# Patient Record
Sex: MALE | Race: WHITE | NOT HISPANIC OR LATINO | URBAN - METROPOLITAN AREA
[De-identification: names, ages, dates, MRNs, and addresses within clinical notes are randomized per-mention and may not be internally consistent; named-entity substitution may affect disease eponyms.]

---

## 2017-12-21 ENCOUNTER — APPOINTMENT (OUTPATIENT)
Age: 52
Setting detail: DERMATOLOGY
End: 2018-01-01

## 2017-12-21 DIAGNOSIS — L738 OTHER SPECIFIED DISEASES OF HAIR AND HAIR FOLLICLES: ICD-10-CM

## 2017-12-21 DIAGNOSIS — L81.4 OTHER MELANIN HYPERPIGMENTATION: ICD-10-CM

## 2017-12-21 DIAGNOSIS — L57.8 OTHER SKIN CHANGES DUE TO CHRONIC EXPOSURE TO NONIONIZING RADIATION: ICD-10-CM

## 2017-12-21 DIAGNOSIS — L71.8 OTHER ROSACEA: ICD-10-CM

## 2017-12-21 DIAGNOSIS — D22 MELANOCYTIC NEVI: ICD-10-CM

## 2017-12-21 DIAGNOSIS — L663 OTHER SPECIFIED DISEASES OF HAIR AND HAIR FOLLICLES: ICD-10-CM

## 2017-12-21 DIAGNOSIS — L57.0 ACTINIC KERATOSIS: ICD-10-CM

## 2017-12-21 DIAGNOSIS — I83.9 ASYMPTOMATIC VARICOSE VEINS OF LOWER EXTREMITIES: ICD-10-CM

## 2017-12-21 DIAGNOSIS — I78.8 OTHER DISEASES OF CAPILLARIES: ICD-10-CM

## 2017-12-21 DIAGNOSIS — Z71.89 OTHER SPECIFIED COUNSELING: ICD-10-CM

## 2017-12-21 DIAGNOSIS — L82.1 OTHER SEBORRHEIC KERATOSIS: ICD-10-CM

## 2017-12-21 PROBLEM — I83.92 ASYMPTOMATIC VARICOSE VEINS OF LEFT LOWER EXTREMITY: Status: ACTIVE | Noted: 2017-12-21

## 2017-12-21 PROBLEM — L02.222 FURUNCLE OF BACK [ANY PART, EXCEPT BUTTOCK]: Status: ACTIVE | Noted: 2017-12-21

## 2017-12-21 PROBLEM — L02.223 FURUNCLE OF CHEST WALL: Status: ACTIVE | Noted: 2017-12-21

## 2017-12-21 PROBLEM — D22.5 MELANOCYTIC NEVI OF TRUNK: Status: ACTIVE | Noted: 2017-12-21

## 2017-12-21 PROCEDURE — OTHER OTHER: OTHER

## 2017-12-21 PROCEDURE — 17003 DESTRUCT PREMALG LES 2-14: CPT

## 2017-12-21 PROCEDURE — OTHER LIQUID NITROGEN: OTHER

## 2017-12-21 PROCEDURE — OTHER PRESCRIPTION: OTHER

## 2017-12-21 PROCEDURE — OTHER MIPS QUALITY: OTHER

## 2017-12-21 PROCEDURE — OTHER REASSURANCE: OTHER

## 2017-12-21 PROCEDURE — 17000 DESTRUCT PREMALG LESION: CPT

## 2017-12-21 PROCEDURE — OTHER COUNSELING: OTHER

## 2017-12-21 PROCEDURE — 99203 OFFICE O/P NEW LOW 30 MIN: CPT | Mod: 25

## 2017-12-21 RX ORDER — CLINDAMYCIN PHOSPHATE 10 MG/ML
SOLUTION TOPICAL QD
Qty: 1 | Refills: 1 | Status: ERX | COMMUNITY
Start: 2017-12-21

## 2017-12-21 ASSESSMENT — LOCATION DETAILED DESCRIPTION DERM
LOCATION DETAILED: RIGHT CHIN
LOCATION DETAILED: RIGHT SUPERIOR MEDIAL UPPER BACK
LOCATION DETAILED: LEFT SUPERIOR FRONTAL SCALP
LOCATION DETAILED: RIGHT NASAL ALA
LOCATION DETAILED: LEFT MEDIAL FRONTAL SCALP
LOCATION DETAILED: LEFT PROXIMAL PRETIBIAL REGION
LOCATION DETAILED: LEFT SUPERIOR MEDIAL UPPER BACK
LOCATION DETAILED: NASAL INFRATIP
LOCATION DETAILED: LEFT NASAL ALA
LOCATION DETAILED: RIGHT NASAL DORSUM
LOCATION DETAILED: RIGHT INFERIOR CENTRAL MALAR CHEEK
LOCATION DETAILED: RIGHT SUPERIOR LATERAL UPPER BACK
LOCATION DETAILED: RIGHT POSTERIOR SHOULDER
LOCATION DETAILED: RIGHT CENTRAL MALAR CHEEK
LOCATION DETAILED: RIGHT MEDIAL FRONTAL SCALP
LOCATION DETAILED: LEFT CENTRAL MALAR CHEEK
LOCATION DETAILED: LEFT CENTRAL FRONTAL SCALP
LOCATION DETAILED: MID POSTERIOR NECK
LOCATION DETAILED: LEFT DISTAL DORSAL FOREARM
LOCATION DETAILED: STERNUM
LOCATION DETAILED: LEFT CHIN
LOCATION DETAILED: LEFT CENTRAL PARIETAL SCALP
LOCATION DETAILED: LEFT INFERIOR CENTRAL MALAR CHEEK
LOCATION DETAILED: RIGHT DISTAL DORSAL FOREARM
LOCATION DETAILED: RIGHT INFERIOR FOREHEAD
LOCATION DETAILED: LEFT MEDIAL TRAPEZIAL NECK
LOCATION DETAILED: RIGHT SUPERIOR FOREHEAD

## 2017-12-21 ASSESSMENT — LOCATION SIMPLE DESCRIPTION DERM
LOCATION SIMPLE: LEFT UPPER BACK
LOCATION SIMPLE: RIGHT UPPER BACK
LOCATION SIMPLE: LEFT SCALP
LOCATION SIMPLE: NOSE
LOCATION SIMPLE: POSTERIOR NECK
LOCATION SIMPLE: LEFT PRETIBIAL REGION
LOCATION SIMPLE: CHIN
LOCATION SIMPLE: RIGHT CHEEK
LOCATION SIMPLE: LEFT NOSE
LOCATION SIMPLE: CHEST
LOCATION SIMPLE: LEFT CHEEK
LOCATION SIMPLE: RIGHT FOREARM
LOCATION SIMPLE: RIGHT SCALP
LOCATION SIMPLE: RIGHT SHOULDER
LOCATION SIMPLE: RIGHT NOSE
LOCATION SIMPLE: RIGHT FOREHEAD
LOCATION SIMPLE: SCALP
LOCATION SIMPLE: LEFT FOREARM

## 2017-12-21 ASSESSMENT — LOCATION ZONE DERM
LOCATION ZONE: ARM
LOCATION ZONE: NECK
LOCATION ZONE: TRUNK
LOCATION ZONE: SCALP
LOCATION ZONE: NOSE
LOCATION ZONE: FACE
LOCATION ZONE: LEG

## 2017-12-21 NOTE — PROCEDURE: OTHER
Other (Free Text): Refer to 
Note Text (......Xxx Chief Complaint.): This diagnosis correlates with the
Detail Level: Zone
Other (Free Text): Patient declines treatment
Other (Free Text): Refer to Dr Reina for consult
Detail Level: Detailed

## 2017-12-21 NOTE — PROCEDURE: LIQUID NITROGEN
Detail Level: Simple
Number Of Freeze-Thaw Cycles: 2 freeze-thaw cycles
Post-Care Instructions: I reviewed with the patient in detail post-care instructions. Patient is to wear sunprotection, and avoid picking at any of the treated lesions. Pt may apply Vaseline to crusted or scabbing areas.
Duration Of Freeze Thaw-Cycle (Seconds): 1
Consent: The patient's consent was obtained including but not limited to risks of crusting, scabbing, blistering, scarring, darker or lighter pigmentary change, recurrence, incomplete removal and infection.
Render Post-Care Instructions In Note?: yes

## 2017-12-22 ENCOUNTER — APPOINTMENT (OUTPATIENT)
Age: 52
Setting detail: DERMATOLOGY
End: 2017-12-29

## 2017-12-22 DIAGNOSIS — Z41.9 ENCOUNTER FOR PROCEDURE FOR PURPOSES OTHER THAN REMEDYING HEALTH STATE, UNSPECIFIED: ICD-10-CM

## 2017-12-22 PROCEDURE — OTHER OTHER (COSMETIC): OTHER

## 2017-12-22 PROCEDURE — OTHER MEDICAL CONSULTATION: PULSED-DYE LASER: OTHER

## 2017-12-22 ASSESSMENT — LOCATION SIMPLE DESCRIPTION DERM
LOCATION SIMPLE: LEFT CHEEK
LOCATION SIMPLE: LEFT FOREHEAD
LOCATION SIMPLE: RIGHT CHEEK

## 2017-12-22 ASSESSMENT — LOCATION DETAILED DESCRIPTION DERM
LOCATION DETAILED: LEFT SUPERIOR MEDIAL FOREHEAD
LOCATION DETAILED: RIGHT CENTRAL MALAR CHEEK
LOCATION DETAILED: LEFT INFERIOR LATERAL MALAR CHEEK

## 2017-12-22 ASSESSMENT — LOCATION ZONE DERM: LOCATION ZONE: FACE

## 2018-01-08 ENCOUNTER — APPOINTMENT (OUTPATIENT)
Age: 53
Setting detail: DERMATOLOGY
End: 2018-01-08

## 2018-01-08 DIAGNOSIS — Z41.9 ENCOUNTER FOR PROCEDURE FOR PURPOSES OTHER THAN REMEDYING HEALTH STATE, UNSPECIFIED: ICD-10-CM

## 2018-01-08 PROCEDURE — OTHER OTHER (COSMETIC): OTHER

## 2018-01-08 PROCEDURE — OTHER PULSED-DYE LASER: OTHER

## 2018-01-08 ASSESSMENT — LOCATION DETAILED DESCRIPTION DERM
LOCATION DETAILED: INFERIOR MID FOREHEAD
LOCATION DETAILED: RIGHT NASAL SIDEWALL
LOCATION DETAILED: RIGHT INFERIOR CENTRAL MALAR CHEEK
LOCATION DETAILED: RIGHT MEDIAL MALAR CHEEK
LOCATION DETAILED: NASAL SUPRATIP
LOCATION DETAILED: LEFT NASAL ALA
LOCATION DETAILED: RIGHT MID PREAURICULAR CHEEK
LOCATION DETAILED: LEFT CENTRAL MALAR CHEEK
LOCATION DETAILED: LEFT SUPERIOR CENTRAL MALAR CHEEK
LOCATION DETAILED: NASAL DORSUM

## 2018-01-08 ASSESSMENT — LOCATION SIMPLE DESCRIPTION DERM
LOCATION SIMPLE: LEFT NOSE
LOCATION SIMPLE: RIGHT NOSE
LOCATION SIMPLE: LEFT CHEEK
LOCATION SIMPLE: RIGHT CHEEK
LOCATION SIMPLE: NOSE
LOCATION SIMPLE: INFERIOR FOREHEAD

## 2018-01-08 ASSESSMENT — LOCATION ZONE DERM
LOCATION ZONE: FACE
LOCATION ZONE: NOSE

## 2018-01-08 NOTE — PROCEDURE: OTHER (COSMETIC)
Detail Level: Zone
Other (Free Text): Treated face with PDL . well tolerated. pt was given ice to help settle swelling. applied calming cream and spf 50 post tx. LM
Price (Use Numbers Only, No Special Characters Or $): 300.00

## 2018-01-08 NOTE — PROCEDURE: PULSED-DYE LASER
Delay Time (Ms): 20
Cryogen Time (Ms): 30
Spot Size: 5 mm
Fluence In J/Cm2 (Optional): 10.50
Consent: Written consent obtained, risks reviewed including but not limited to crusting, scabbing, blistering, scarring, darker or lighter pigmentary change, incidental hair removal, bruising, and/or incomplete removal.
Spot Size: 7 mm
Pulse Duration: 1.5 ms
Fluence In J/Cm2 (Optional): 11
Location (Required For Details To Render In Note But Body Touch Will Also Count For First Location): mid face
Pulse Duration: 10 ms
Post-Care Instructions: I reviewed with the patient in detail post-care instructions. Patient should stay away from the sun and wear sun protection until treated areas are fully healed.
Detail Level: Zone
Price (Use Numbers Only, No Special Characters Or $): N/c
Fluence In J/Cm2 (Optional): 10
Post-Procedure Care: Vaseline and ice applied. Post care reviewed with patient.
Cryogen Time (Ms): 40
Laser Type: Vbeam 595nm
Treated Area: small area

## 2018-01-12 ENCOUNTER — APPOINTMENT (OUTPATIENT)
Age: 53
Setting detail: DERMATOLOGY
End: 2018-01-25

## 2018-01-12 DIAGNOSIS — I83.89 VARICOSE VEINS OF LOWER EXTREMITIES WITH OTHER COMPLICATIONS: ICD-10-CM

## 2018-01-12 PROBLEM — I83.893 VARICOSE VEINS OF BILATERAL LOWER EXTREMITIES WITH OTHER COMPLICATIONS: Status: ACTIVE | Noted: 2018-01-12

## 2018-01-12 PROCEDURE — OTHER SEPARATE AND IDENTIFIABLE DOCUMENTATION: OTHER

## 2018-01-12 PROCEDURE — 93970 EXTREMITY STUDY: CPT

## 2018-01-12 PROCEDURE — OTHER COUNSELING: OTHER

## 2018-01-12 PROCEDURE — OTHER CEAP-C CLASSIFICATION: OTHER

## 2018-01-12 PROCEDURE — OTHER DOPPLER US: OTHER

## 2018-01-12 PROCEDURE — OTHER EDUCATIONAL RESOURCES PROVIDED: OTHER

## 2018-01-12 PROCEDURE — 99213 OFFICE O/P EST LOW 20 MIN: CPT | Mod: 25

## 2018-01-12 ASSESSMENT — LOCATION DETAILED DESCRIPTION DERM
LOCATION DETAILED: LEFT ANTERIOR PROXIMAL THIGH
LOCATION DETAILED: RIGHT ANTERIOR PROXIMAL THIGH
LOCATION DETAILED: RIGHT PROXIMAL PRETIBIAL REGION
LOCATION DETAILED: LEFT DISTAL PRETIBIAL REGION

## 2018-01-12 ASSESSMENT — LOCATION SIMPLE DESCRIPTION DERM
LOCATION SIMPLE: RIGHT THIGH
LOCATION SIMPLE: LEFT THIGH
LOCATION SIMPLE: RIGHT PRETIBIAL REGION
LOCATION SIMPLE: LEFT PRETIBIAL REGION

## 2018-01-12 ASSESSMENT — LOCATION ZONE DERM: LOCATION ZONE: LEG

## 2018-01-12 NOTE — HPI: VEIN EVALUATION
How Severe Is/Are Your Symptoms?: mild
Is This A New Presentation, Or A Follow-Up?: Vein Evaluation
Additional History: Family history of varicose veins (mother, sister).
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

## 2018-01-12 NOTE — PROCEDURE: CEAP-C CLASSIFICATION
Right Dorsalis Pedis Pulse: 2 (Easily palpable)
Left Leg Circumference: medium
Detail Level: Zone
Right Leg: Peripheral Vascular Disease?: No
Follow Up Instructions:: Bilateral duplex ultrasound venous reflux study was reviewed with the results and finalized a treatment plan. Preventive strategies include weight loss through diet and exercise and toning leg muscles. A venous fact sheet was given, which reviews venous anatomy/pathophysiology and treatment options. The pathophysiology of venous disease and potential treatment options were discussed in detail, especially the non-FDA status of foam sclerotherapy with its risks benefits and alternatives. The patient's questions were answered in full

## 2018-02-07 ENCOUNTER — APPOINTMENT (OUTPATIENT)
Age: 53
Setting detail: DERMATOLOGY
End: 2018-02-08

## 2018-02-07 DIAGNOSIS — L738 OTHER SPECIFIED DISEASES OF HAIR AND HAIR FOLLICLES: ICD-10-CM

## 2018-02-07 DIAGNOSIS — B07.8 OTHER VIRAL WARTS: ICD-10-CM

## 2018-02-07 DIAGNOSIS — L663 OTHER SPECIFIED DISEASES OF HAIR AND HAIR FOLLICLES: ICD-10-CM

## 2018-02-07 DIAGNOSIS — I78.8 OTHER DISEASES OF CAPILLARIES: ICD-10-CM

## 2018-02-07 DIAGNOSIS — L57.0 ACTINIC KERATOSIS: ICD-10-CM

## 2018-02-07 DIAGNOSIS — R23.1 PALLOR: ICD-10-CM

## 2018-02-07 PROBLEM — L02.222 FURUNCLE OF BACK [ANY PART, EXCEPT BUTTOCK]: Status: ACTIVE | Noted: 2018-02-07

## 2018-02-07 PROBLEM — L02.02 FURUNCLE OF FACE: Status: ACTIVE | Noted: 2018-02-07

## 2018-02-07 PROCEDURE — OTHER COUNSELING: OTHER

## 2018-02-07 PROCEDURE — OTHER LIQUID NITROGEN: OTHER

## 2018-02-07 PROCEDURE — 17003 DESTRUCT PREMALG LES 2-14: CPT

## 2018-02-07 PROCEDURE — 17110 DESTRUCT B9 LESION 1-14: CPT

## 2018-02-07 PROCEDURE — OTHER BENIGN DESTRUCTION: OTHER

## 2018-02-07 PROCEDURE — OTHER TREATMENT REGIMEN: OTHER

## 2018-02-07 PROCEDURE — 17000 DESTRUCT PREMALG LESION: CPT | Mod: 59

## 2018-02-07 PROCEDURE — 99214 OFFICE O/P EST MOD 30 MIN: CPT | Mod: 25

## 2018-02-07 ASSESSMENT — LOCATION ZONE DERM
LOCATION ZONE: NOSE
LOCATION ZONE: SCALP
LOCATION ZONE: FINGER
LOCATION ZONE: FACE
LOCATION ZONE: TRUNK
LOCATION ZONE: ARM
LOCATION ZONE: HAND

## 2018-02-07 ASSESSMENT — LOCATION DETAILED DESCRIPTION DERM
LOCATION DETAILED: LEFT VENTRAL PROXIMAL FOREARM
LOCATION DETAILED: LEFT RADIAL DORSAL HAND
LOCATION DETAILED: 3RD WEB SPACE RIGHT HAND
LOCATION DETAILED: NASAL TIP
LOCATION DETAILED: RIGHT MEDIAL MALAR CHEEK
LOCATION DETAILED: LEFT DORSAL WRIST
LOCATION DETAILED: LEFT MEDIAL SUPERIOR CHEST
LOCATION DETAILED: RIGHT DORSAL SMALL METACARPOPHALANGEAL JOINT
LOCATION DETAILED: RIGHT SUPERIOR MEDIAL UPPER BACK
LOCATION DETAILED: RIGHT MIDDLE PROXIMAL INTERPHALANGEAL JOINT
LOCATION DETAILED: LEFT MEDIAL MALAR CHEEK
LOCATION DETAILED: RIGHT VENTRAL PROXIMAL FOREARM
LOCATION DETAILED: RIGHT SUPERIOR PARIETAL SCALP
LOCATION DETAILED: LEFT SUPERIOR MEDIAL FOREHEAD
LOCATION DETAILED: RIGHT CENTRAL MALAR CHEEK
LOCATION DETAILED: NASAL SUPRATIP
LOCATION DETAILED: MID-FRONTAL SCALP
LOCATION DETAILED: LEFT NASAL DORSUM
LOCATION DETAILED: NASAL DORSUM

## 2018-02-07 ASSESSMENT — LOCATION SIMPLE DESCRIPTION DERM
LOCATION SIMPLE: RIGHT MIDDLE FINGER
LOCATION SIMPLE: SCALP
LOCATION SIMPLE: RIGHT FOREARM
LOCATION SIMPLE: RIGHT HAND
LOCATION SIMPLE: ANTERIOR SCALP
LOCATION SIMPLE: LEFT WRIST
LOCATION SIMPLE: LEFT CHEEK
LOCATION SIMPLE: LEFT HAND
LOCATION SIMPLE: RIGHT CHEEK
LOCATION SIMPLE: LEFT FOREHEAD
LOCATION SIMPLE: CHEST
LOCATION SIMPLE: LEFT FOREARM
LOCATION SIMPLE: RIGHT UPPER BACK
LOCATION SIMPLE: NOSE

## 2018-02-07 NOTE — PROCEDURE: LIQUID NITROGEN
Duration Of Freeze Thaw-Cycle (Seconds): 1
Number Of Freeze-Thaw Cycles: 2 freeze-thaw cycles
Render Post-Care Instructions In Note?: yes
Post-Care Instructions: I reviewed with the patient in detail post-care instructions. Patient is to wear sunprotection, and avoid picking at any of the treated lesions. Pt may apply Vaseline to crusted or scabbing areas.
Consent: The patient's consent was obtained including but not limited to risks of crusting, scabbing, blistering, scarring, darker or lighter pigmentary change, recurrence, incomplete removal and infection.
Detail Level: Simple

## 2018-02-07 NOTE — PROCEDURE: BENIGN DESTRUCTION
Post-Care Instructions: I reviewed with the patient in detail post-care instructions. Patient is to wear sunprotection, and avoid picking at any of the treated lesions. Pt may apply Vaseline to crusted or scabbing areas.
Include Z78.9 (Other Specified Conditions Influencing Health Status) As An Associated Diagnosis?: Yes
Render Post-Care Instructions In Note?: no
Medical Necessity Information: It is in your best interest to select a reason for this procedure from the list below. All of these items fulfill various CMS LCD requirements except the new and changing color options.
Treatment Number (Will Not Render If 0): 0
Consent: The patient's consent was obtained including but not limited to risks of crusting, scabbing, blistering, scarring, darker or lighter pigmentary change, recurrence, incomplete removal and infection.
Detail Level: Simple
Medical Necessity Clause: This procedure was medically necessary because the lesions that were treated were: recurrent

## 2018-02-14 ENCOUNTER — APPOINTMENT (OUTPATIENT)
Age: 53
Setting detail: DERMATOLOGY
End: 2018-02-15

## 2018-02-14 DIAGNOSIS — L82.1 OTHER SEBORRHEIC KERATOSIS: ICD-10-CM

## 2018-02-14 DIAGNOSIS — L72.0 EPIDERMAL CYST: ICD-10-CM

## 2018-02-14 DIAGNOSIS — L73.8 OTHER SPECIFIED FOLLICULAR DISORDERS: ICD-10-CM

## 2018-02-14 DIAGNOSIS — D22 MELANOCYTIC NEVI: ICD-10-CM

## 2018-02-14 PROBLEM — D22.5 MELANOCYTIC NEVI OF TRUNK: Status: ACTIVE | Noted: 2018-02-14

## 2018-02-14 PROCEDURE — OTHER OTHER: OTHER

## 2018-02-14 PROCEDURE — OTHER COUNSELING: OTHER

## 2018-02-14 PROCEDURE — OTHER TREATMENT REGIMEN: OTHER

## 2018-02-14 PROCEDURE — 99213 OFFICE O/P EST LOW 20 MIN: CPT | Mod: 24

## 2018-02-14 ASSESSMENT — LOCATION ZONE DERM
LOCATION ZONE: FACE
LOCATION ZONE: TRUNK
LOCATION ZONE: EYELID

## 2018-02-14 ASSESSMENT — LOCATION SIMPLE DESCRIPTION DERM
LOCATION SIMPLE: LEFT FOREHEAD
LOCATION SIMPLE: RIGHT FOREHEAD
LOCATION SIMPLE: CHEST
LOCATION SIMPLE: RIGHT SUPERIOR EYELID

## 2018-02-14 ASSESSMENT — LOCATION DETAILED DESCRIPTION DERM
LOCATION DETAILED: RIGHT FOREHEAD
LOCATION DETAILED: RIGHT MEDIAL SUPERIOR EYELID
LOCATION DETAILED: LEFT MEDIAL FOREHEAD
LOCATION DETAILED: LEFT INFERIOR FOREHEAD
LOCATION DETAILED: RIGHT MEDIAL INFERIOR CHEST

## 2018-02-14 NOTE — PROCEDURE: OTHER
Detail Level: Detailed
Note Text (......Xxx Chief Complaint.): This diagnosis correlates with the
Other (Free Text): Quoted $150/session

## 2018-03-31 ENCOUNTER — RX ONLY (RX ONLY)
Age: 53
End: 2018-03-31

## 2018-03-31 RX ORDER — CLINDAMYCIN PHOSPHATE 10 MG/ML
SOLUTION TOPICAL
Qty: 1 | Refills: 2 | Status: ERX

## 2018-04-13 ENCOUNTER — APPOINTMENT (OUTPATIENT)
Age: 53
Setting detail: DERMATOLOGY
End: 2018-04-18

## 2018-04-13 DIAGNOSIS — I83.89 VARICOSE VEINS OF LOWER EXTREMITIES WITH OTHER COMPLICATIONS: ICD-10-CM

## 2018-04-13 PROBLEM — I83.893 VARICOSE VEINS OF BILATERAL LOWER EXTREMITIES WITH OTHER COMPLICATIONS: Status: ACTIVE | Noted: 2018-04-13

## 2018-04-13 PROCEDURE — OTHER CEAP-C CLASSIFICATION: OTHER

## 2018-04-13 PROCEDURE — OTHER COUNSELING: OTHER

## 2018-04-13 PROCEDURE — 99213 OFFICE O/P EST LOW 20 MIN: CPT

## 2018-04-13 PROCEDURE — OTHER SEPARATE AND IDENTIFIABLE DOCUMENTATION: OTHER

## 2018-04-13 PROCEDURE — OTHER EDUCATIONAL RESOURCES PROVIDED: OTHER

## 2018-04-13 ASSESSMENT — LOCATION DETAILED DESCRIPTION DERM
LOCATION DETAILED: LEFT ANTERIOR DISTAL THIGH
LOCATION DETAILED: RIGHT ANTERIOR PROXIMAL THIGH

## 2018-04-13 ASSESSMENT — LOCATION ZONE DERM: LOCATION ZONE: LEG

## 2018-04-13 ASSESSMENT — LOCATION SIMPLE DESCRIPTION DERM
LOCATION SIMPLE: RIGHT THIGH
LOCATION SIMPLE: LEFT THIGH

## 2018-06-13 ENCOUNTER — APPOINTMENT (OUTPATIENT)
Age: 53
Setting detail: DERMATOLOGY
End: 2018-06-27

## 2018-06-13 DIAGNOSIS — I83.89 VARICOSE VEINS OF LOWER EXTREMITIES WITH OTHER COMPLICATIONS: ICD-10-CM

## 2018-06-13 PROBLEM — I83.891 VARICOSE VEINS OF RIGHT LOWER EXTREMITY WITH OTHER COMPLICATIONS: Status: ACTIVE | Noted: 2018-06-13

## 2018-06-13 PROCEDURE — OTHER ENDOVENOUS ABLATION: OTHER

## 2018-06-13 PROCEDURE — 36475 ENDOVENOUS RF 1ST VEIN: CPT

## 2018-06-13 ASSESSMENT — LOCATION SIMPLE DESCRIPTION DERM: LOCATION SIMPLE: RIGHT PRETIBIAL REGION

## 2018-06-13 ASSESSMENT — LOCATION ZONE DERM: LOCATION ZONE: LEG

## 2018-06-13 ASSESSMENT — LOCATION DETAILED DESCRIPTION DERM: LOCATION DETAILED: RIGHT PROXIMAL PRETIBIAL REGION

## 2018-06-13 NOTE — PROCEDURE: ENDOVENOUS ABLATION
Consent was obtained with risks, benefits, and alternatives discussed for the above procedures. Preoperative medications were taken as above.
Estimated Blood Loss (Cc): minimal
Rf Cycles: 8
Disposition: Compression dressings were placed. Wound care instructions were given, verbal and written.\\n\\nPost-treatment Instructions:\\n• You may experience pain and inflammation, if you are not allergic or sensitive, please take Advil, Motrin or Ibuprofen 600mg 3 times daily with food.\\n• Use the prescription pain medicine if needed.\\n• You may feel a “tight” or pulling sensation or firm “cord” in your leg for several weeks. Massaging your legs will help. This feeling will go away with time.\\n• Walk but avoid prolonged standing or sitting. Elevate your legs as much as possible for the first three (3) days after your treatment.\\n• You may resume normal activity after three (3) days and engage in heavy exercise ten (10) days after your treatment.\\n• Remove wrap after 48 hours, leaving the steri-strip tape in place. At that time, you may shower. Do not be concerned if the steri-strip comes off.\\n• Wear compression stockings for two (2) weeks, removing them at bedtime. \\n• If your have taken a sedative (valium) during your treatment, we advise you to arrange for transportation home after your treatment.\\n• If you have any questions or concerns after your treatment, contact our office immediately. In the event of a life-threatening emergency, contact 911.
Body Location Override (Optional - Billing Will Still Be Based On Selected Body Map Location): right lower leg
Detail Level: Detailed
Number Of Incisions Per Microphlebectomy: 0
Hemostasis: Pressure
Tumescent Anesthesia Volume In Cc: 300
Rf Time (Include Units): 2:00

## 2018-06-15 ENCOUNTER — APPOINTMENT (OUTPATIENT)
Age: 53
Setting detail: DERMATOLOGY
End: 2018-07-13

## 2018-06-15 DIAGNOSIS — I83.89 VARICOSE VEINS OF LOWER EXTREMITIES WITH OTHER COMPLICATIONS: ICD-10-CM

## 2018-06-15 PROBLEM — I83.893 VARICOSE VEINS OF BILATERAL LOWER EXTREMITIES WITH OTHER COMPLICATIONS: Status: ACTIVE | Noted: 2018-06-15

## 2018-06-15 PROCEDURE — OTHER ENDOVENOUS ABLATION: OTHER

## 2018-06-15 PROCEDURE — 36475 ENDOVENOUS RF 1ST VEIN: CPT

## 2018-06-15 PROCEDURE — OTHER DOPPLER US: OTHER

## 2018-06-15 PROCEDURE — 93971 EXTREMITY STUDY: CPT | Mod: 59

## 2018-06-15 ASSESSMENT — LOCATION SIMPLE DESCRIPTION DERM
LOCATION SIMPLE: LEFT PRETIBIAL REGION
LOCATION SIMPLE: RIGHT THIGH

## 2018-06-15 ASSESSMENT — LOCATION ZONE DERM: LOCATION ZONE: LEG

## 2018-06-15 ASSESSMENT — LOCATION DETAILED DESCRIPTION DERM
LOCATION DETAILED: RIGHT ANTERIOR DISTAL THIGH
LOCATION DETAILED: LEFT PROXIMAL PRETIBIAL REGION

## 2018-06-15 NOTE — PROCEDURE: ENDOVENOUS ABLATION
Estimated Blood Loss (Cc): minimal
Rf Time (Include Units): 3:20
Hemostasis: Pressure
Tumescent Anesthesia Volume In Cc: 300
Rf Cycles: 10
Body Location Override (Optional - Billing Will Still Be Based On Selected Body Map Location): left leg
Number Of Incisions Per Microphlebectomy: 0
Disposition: Compression dressings were placed. Wound care instructions were given, verbal and written.\\n\\nPost-treatment Instructions:\\n• You may experience pain and inflammation, if you are not allergic or sensitive, please take Advil, Motrin or Ibuprofen 600mg 3 times daily with food.\\n• Use the prescription pain medicine if needed.\\n• You may feel a “tight” or pulling sensation or firm “cord” in your leg for several weeks. Massaging your legs will help. This feeling will go away with time.\\n• Walk but avoid prolonged standing or sitting. Elevate your legs as much as possible for the first three (3) days after your treatment.\\n• You may resume normal activity after three (3) days and engage in heavy exercise ten (10) days after your treatment.\\n• Remove wrap after 48 hours, leaving the steri-strip tape in place. At that time, you may shower. Do not be concerned if the steri-strip comes off.\\n• Wear compression stockings for two (2) weeks, removing them at bedtime. \\n• If your have taken a sedative (valium) during your treatment, we advise you to arrange for transportation home after your treatment.\\n• If you have any questions or concerns after your treatment, contact our office immediately. In the event of a life-threatening emergency, contact 911.
Detail Level: Detailed
Consent was obtained with risks, benefits, and alternatives discussed for the above procedures. Preoperative medications were taken as above.

## 2018-06-27 ENCOUNTER — APPOINTMENT (OUTPATIENT)
Age: 53
Setting detail: DERMATOLOGY
End: 2018-07-13

## 2018-06-27 DIAGNOSIS — I83.89 VARICOSE VEINS OF LOWER EXTREMITIES WITH OTHER COMPLICATIONS: ICD-10-CM

## 2018-06-27 PROBLEM — I83.893 VARICOSE VEINS OF BILATERAL LOWER EXTREMITIES WITH OTHER COMPLICATIONS: Status: ACTIVE | Noted: 2018-06-27

## 2018-06-27 PROCEDURE — OTHER DOPPLER US: OTHER

## 2018-06-27 PROCEDURE — 99214 OFFICE O/P EST MOD 30 MIN: CPT | Mod: 25

## 2018-06-27 PROCEDURE — 93970 EXTREMITY STUDY: CPT

## 2018-06-27 ASSESSMENT — LOCATION SIMPLE DESCRIPTION DERM
LOCATION SIMPLE: LEFT THIGH
LOCATION SIMPLE: RIGHT THIGH

## 2018-06-27 ASSESSMENT — LOCATION DETAILED DESCRIPTION DERM
LOCATION DETAILED: RIGHT ANTERIOR DISTAL THIGH
LOCATION DETAILED: LEFT ANTERIOR PROXIMAL THIGH

## 2018-06-27 ASSESSMENT — LOCATION ZONE DERM: LOCATION ZONE: LEG

## 2018-09-28 ENCOUNTER — APPOINTMENT (OUTPATIENT)
Age: 53
Setting detail: DERMATOLOGY
End: 2018-09-28

## 2018-09-28 DIAGNOSIS — I83.89 VARICOSE VEINS OF LOWER EXTREMITIES WITH OTHER COMPLICATIONS: ICD-10-CM

## 2018-09-28 PROBLEM — I83.893 VARICOSE VEINS OF BILATERAL LOWER EXTREMITIES WITH OTHER COMPLICATIONS: Status: ACTIVE | Noted: 2018-09-28

## 2018-09-28 PROCEDURE — 99214 OFFICE O/P EST MOD 30 MIN: CPT | Mod: 25

## 2018-09-28 PROCEDURE — 93970 EXTREMITY STUDY: CPT

## 2018-09-28 PROCEDURE — OTHER DOPPLER US: OTHER

## 2018-09-28 ASSESSMENT — LOCATION DETAILED DESCRIPTION DERM
LOCATION DETAILED: RIGHT PROXIMAL PRETIBIAL REGION
LOCATION DETAILED: LEFT PROXIMAL PRETIBIAL REGION

## 2018-09-28 ASSESSMENT — LOCATION SIMPLE DESCRIPTION DERM
LOCATION SIMPLE: LEFT PRETIBIAL REGION
LOCATION SIMPLE: RIGHT PRETIBIAL REGION

## 2018-09-28 ASSESSMENT — LOCATION ZONE DERM: LOCATION ZONE: LEG

## 2018-12-07 ENCOUNTER — APPOINTMENT (OUTPATIENT)
Age: 53
Setting detail: DERMATOLOGY
End: 2018-12-31

## 2018-12-07 DIAGNOSIS — I83.9 ASYMPTOMATIC VARICOSE VEINS OF LOWER EXTREMITIES: ICD-10-CM

## 2018-12-07 PROBLEM — I83.93 ASYMPTOMATIC VARICOSE VEINS OF BILATERAL LOWER EXTREMITIES: Status: ACTIVE | Noted: 2018-12-07

## 2018-12-07 PROCEDURE — OTHER RECOMMENDATIONS: OTHER

## 2018-12-07 PROCEDURE — OTHER COUNSELING: OTHER

## 2018-12-07 PROCEDURE — 99214 OFFICE O/P EST MOD 30 MIN: CPT

## 2018-12-07 ASSESSMENT — LOCATION SIMPLE DESCRIPTION DERM
LOCATION SIMPLE: RIGHT THIGH
LOCATION SIMPLE: LEFT THIGH

## 2018-12-07 ASSESSMENT — LOCATION ZONE DERM: LOCATION ZONE: LEG

## 2018-12-07 ASSESSMENT — LOCATION DETAILED DESCRIPTION DERM
LOCATION DETAILED: LEFT ANTERIOR DISTAL THIGH
LOCATION DETAILED: RIGHT ANTERIOR PROXIMAL THIGH

## 2018-12-07 NOTE — PROCEDURE: RECOMMENDATIONS
Detail Level: Zone
Recommendation Preamble: The following recommendations were made during the visit: have further evaluation and work up for the swelling.

## 2022-11-21 NOTE — PROCEDURE: CEAP-C CLASSIFICATION
Detail Level: Zone
Transportation
Left Leg Circumference: medium
Left Dorsalis Pedis Pulse: 2 (Easily palpable)
Right Leg: Peripheral Vascular Disease?: No
Follow Up Instructions:: Bilateral duplex ultrasound venous reflux study was reviewed with the results and finalized a treatment plan. Preventive strategies include weight loss through diet and exercise and toning leg muscles. A venous fact sheet was given, which reviews venous anatomy/pathophysiology and treatment options. The pathophysiology of venous disease and potential treatment options were discussed in detail, especially the non-FDA status of foam sclerotherapy with its risks benefits and alternatives. The patient's questions were answered in full